# Patient Record
(demographics unavailable — no encounter records)

---

## 2025-06-26 NOTE — REASON FOR VISIT
[Initial Evaluation] : an initial evaluation [FreeTextEntry1] : New onset headache seizure rule out CVA

## 2025-06-26 NOTE — ASSESSMENT
[FreeTextEntry1] : Impression: This 33-year-old female patient has a history of headache for the last several months predominately right hemicranial and posterior with exacerbation while visiting in [place].  There was an episode of loss of consciousness and suspected seizure.  The patient was seen in the emergency room and then transferred to another hospital for further workup.  Continuous EEG for 48 hours was normal.  MRI of the brain revealed questionable diffusion abnormalities left yazmin which may actually have been artifactual and there was a tiny left cerebellar lesion possibly representing a chronic stroke.  The patient was diagnosed with diabetes with an elevated A1c which could have been etiologic in this setting.  She was started on insulin and metformin.  She was started on topiramate 25 mg once at bedtime.  Today's neurological exam is normal.  I am not convinced this patient has a seizure disorder or a CVA.  Recommendations: Increase topiramate 25 mg to 2 tablets at bedtime.  MRI of the brain with and without contrast at [facility].  No driving.  Follow-up with medicine/endocrinology for diabetic control.  Office follow-up.

## 2025-06-26 NOTE — PHYSICAL EXAM
[FreeTextEntry1] : Head:  Normocephalic Neck: Supple nontender no carotid bruits.   Mental Status:  Alert Oriented X3 Speech normal and no aphasia or dysarthria.  Cranial Nerves:  PERRL, Fundi limited visualization appears normal with sharp discs.  Visual Fields full  EOMI no diplopia no ptosis no nystagmus, V through XII intact.  Motor:  No drift, normal strength tone and coordination and no focal atrophy. No abnormal movements. No dysmetria.  Normal rapid alternating movements.   DTRs: Symmetric and 2+.  Plantars flexor.  No Clonus.  Sensory:  Normal testing with pin light touch and position sense.  Gait:  Normal including tandem walking heel toe walking and Rhomberg.

## 2025-07-23 NOTE — ASSESSMENT
[FreeTextEntry1] : Impression: This 33-year-old female patient with history of several months of headache and episode of loss of consciousness with suspected seizure though 48-hour EEG was normal.  MRI of the brain showed questionable abnormalities in [place] however MRI of the brain on 7/11/2025 with and without contrast recently performed is normal other than partial empty sella.  Today's neurological exam remains normal.  Headaches have resolved while taking topiramate 25 mg 2 tablets at bedtime.  Patient was found to have significant diabetes while in [place] in that setting.  Doubt seizure disorder.  Suspect diabetes out-of-control.  Recommendations: Taper and discontinue topiramate as directed.  Diabetic control.  Office follow-up as directed.

## 2025-07-23 NOTE — PHYSICAL EXAM
[FreeTextEntry1] : Head:  Normocephalic Neck: Supple nontender no carotid bruits.   Mental Status:  Alert Oriented X3 Speech normal and no aphasia or dysarthria.  Cranial Nerves:  PERRL Visual Fields full  EOMI no diplopia no ptosis no nystagmus, V through XII intact.  Motor:  No drift, normal strength tone and coordination and no focal atrophy. No abnormal movements. No dysmetria.  Normal rapid alternating movements.   DTRs: Symmetric and 2+.  Plantars flexor.  No Clonus.  Sensory:  Normal testing with pin light touch.  Gait: Regular.

## 2025-07-23 NOTE — HISTORY OF PRESENT ILLNESS
[FreeTextEntry1] : This patient is seen for an office visit.  She is a 33-year-old female with a presentation of predominantly right hemicranial and posterior headache and an episode of loss of consciousness while in [place].  MRI of the brain at that time suggested possible diffusion abnormalities in the yazmin versus artifact.  MRI of the brain on 7/11/2025 performed with and without contrast dated is remarkable only for partially empty sella and maxillary sinus mucosal thickening but otherwise normal without evidence of any other significant abnormalities.  The patient has been taking topiramate 25 mg 2 tablets at bedtime and headaches have completely resolved.  She denies side effects.